# Patient Record
Sex: MALE | Race: BLACK OR AFRICAN AMERICAN | NOT HISPANIC OR LATINO | Employment: FULL TIME | URBAN - METROPOLITAN AREA
[De-identification: names, ages, dates, MRNs, and addresses within clinical notes are randomized per-mention and may not be internally consistent; named-entity substitution may affect disease eponyms.]

---

## 2022-04-13 ENCOUNTER — HISTORICAL (OUTPATIENT)
Dept: RADIOLOGY | Facility: HOSPITAL | Age: 47
End: 2022-04-13

## 2022-04-13 ENCOUNTER — HISTORICAL (OUTPATIENT)
Dept: ADMINISTRATIVE | Facility: HOSPITAL | Age: 47
End: 2022-04-13

## 2022-05-03 DIAGNOSIS — Z87.891 PERSONAL HISTORY OF TOBACCO USE, PRESENTING HAZARDS TO HEALTH: Primary | ICD-10-CM

## 2022-05-05 DIAGNOSIS — Z87.891 HISTORY OF TOBACCO USE: Primary | ICD-10-CM

## 2022-05-12 DIAGNOSIS — R22.2 PALPABLE MASS OF LOWER BACK: Primary | ICD-10-CM

## 2022-07-21 DIAGNOSIS — E78.2 MIXED HYPERLIPIDEMIA: Primary | ICD-10-CM

## 2022-07-21 NOTE — PROGRESS NOTES
Pt is supposed to have an OV tomorrow 07/22/22 Virtually for HLD f/u with fasting labs - labs not completed as of this AM. Please call and advise the patient he should come in an complete his labs. Will need to reschedule for another virtual visit next week.    Thanks.

## 2022-07-22 ENCOUNTER — LAB VISIT (OUTPATIENT)
Dept: LAB | Facility: HOSPITAL | Age: 47
End: 2022-07-22
Attending: NURSE PRACTITIONER
Payer: COMMERCIAL

## 2022-07-22 DIAGNOSIS — E78.2 MIXED HYPERLIPIDEMIA: ICD-10-CM

## 2022-07-22 LAB
ANION GAP SERPL CALC-SCNC: 7 MEQ/L
APPEARANCE UR: CLEAR
BACTERIA #/AREA URNS AUTO: ABNORMAL /HPF
BILIRUB UR QL STRIP.AUTO: NEGATIVE MG/DL
BUN SERPL-MCNC: 11.8 MG/DL (ref 8.9–20.6)
CALCIUM SERPL-MCNC: 9.8 MG/DL (ref 8.4–10.2)
CHLORIDE SERPL-SCNC: 103 MMOL/L (ref 98–107)
CHOLEST SERPL-MCNC: 220 MG/DL
CHOLEST/HDLC SERPL: 6 {RATIO} (ref 0–5)
CO2 SERPL-SCNC: 28 MMOL/L (ref 22–29)
COLOR UR AUTO: ABNORMAL
CREAT SERPL-MCNC: 1.13 MG/DL (ref 0.73–1.18)
CREAT/UREA NIT SERPL: 10
GLUCOSE SERPL-MCNC: 91 MG/DL (ref 74–100)
GLUCOSE UR QL STRIP.AUTO: NORMAL MG/DL
HDLC SERPL-MCNC: 38 MG/DL (ref 35–60)
HYALINE CASTS #/AREA URNS LPF: ABNORMAL /LPF
KETONES UR QL STRIP.AUTO: NEGATIVE MG/DL
LDLC SERPL CALC-MCNC: 151 MG/DL (ref 50–140)
LEUKOCYTE ESTERASE UR QL STRIP.AUTO: NEGATIVE UNIT/L
NITRITE UR QL STRIP.AUTO: NEGATIVE
PH UR STRIP.AUTO: 5.5 [PH]
POTASSIUM SERPL-SCNC: 4.1 MMOL/L (ref 3.5–5.1)
PROT UR QL STRIP.AUTO: NEGATIVE MG/DL
RBC #/AREA URNS AUTO: ABNORMAL /HPF
RBC UR QL AUTO: ABNORMAL UNIT/L
SODIUM SERPL-SCNC: 138 MMOL/L (ref 136–145)
SP GR UR STRIP.AUTO: 1.01
SQUAMOUS #/AREA URNS LPF: ABNORMAL /HPF
TRIGL SERPL-MCNC: 154 MG/DL (ref 34–140)
UROBILINOGEN UR STRIP-ACNC: NORMAL MG/DL
VLDLC SERPL CALC-MCNC: 31 MG/DL
WBC #/AREA URNS AUTO: ABNORMAL /HPF

## 2022-07-22 PROCEDURE — 80061 LIPID PANEL: CPT

## 2022-07-22 PROCEDURE — 81001 URINALYSIS AUTO W/SCOPE: CPT

## 2022-07-22 PROCEDURE — 36415 COLL VENOUS BLD VENIPUNCTURE: CPT

## 2022-07-22 PROCEDURE — 80048 BASIC METABOLIC PNL TOTAL CA: CPT

## 2022-07-27 PROBLEM — I10 PRIMARY HYPERTENSION: Chronic | Status: ACTIVE | Noted: 2022-07-27

## 2022-07-27 PROBLEM — E78.2 MIXED HYPERLIPIDEMIA: Chronic | Status: ACTIVE | Noted: 2022-07-27

## 2022-07-27 PROBLEM — I10 PRIMARY HYPERTENSION: Status: ACTIVE | Noted: 2022-07-27

## 2022-08-01 ENCOUNTER — OFFICE VISIT (OUTPATIENT)
Dept: INTERNAL MEDICINE | Facility: CLINIC | Age: 47
End: 2022-08-01
Payer: COMMERCIAL

## 2022-08-01 DIAGNOSIS — E78.2 MIXED HYPERLIPIDEMIA: Primary | Chronic | ICD-10-CM

## 2022-08-01 PROCEDURE — 1160F PR REVIEW ALL MEDS BY PRESCRIBER/CLIN PHARMACIST DOCUMENTED: ICD-10-PCS | Mod: CPTII,95,, | Performed by: NURSE PRACTITIONER

## 2022-08-01 PROCEDURE — 99214 PR OFFICE/OUTPT VISIT, EST, LEVL IV, 30-39 MIN: ICD-10-PCS | Mod: 95,,, | Performed by: NURSE PRACTITIONER

## 2022-08-01 PROCEDURE — 1159F MED LIST DOCD IN RCRD: CPT | Mod: CPTII,95,, | Performed by: NURSE PRACTITIONER

## 2022-08-01 PROCEDURE — 99214 OFFICE O/P EST MOD 30 MIN: CPT | Mod: 95,,, | Performed by: NURSE PRACTITIONER

## 2022-08-01 PROCEDURE — 1159F PR MEDICATION LIST DOCUMENTED IN MEDICAL RECORD: ICD-10-PCS | Mod: CPTII,95,, | Performed by: NURSE PRACTITIONER

## 2022-08-01 PROCEDURE — 1160F RVW MEDS BY RX/DR IN RCRD: CPT | Mod: CPTII,95,, | Performed by: NURSE PRACTITIONER

## 2022-08-01 RX ORDER — ROSUVASTATIN CALCIUM 40 MG/1
40 TABLET, COATED ORAL NIGHTLY
Qty: 30 TABLET | Refills: 3 | Status: SHIPPED | OUTPATIENT
Start: 2022-08-01 | End: 2024-03-27

## 2022-08-01 NOTE — ASSESSMENT & PLAN NOTE
Increase RX Rosuvastatin to 40 mg every evening  F/U in 3 months via audio virtual with fasting labs prior to eval.    Follow a low cholesterol, low saturated fat diet with less than 200 mg of cholesterol a day.   Avoid fried foods and high saturated fats (kelly, sausage, cookies, cakes, chips, cheese, whole milk, butter, mayonnaise, creamy dressings, gravy, stew, gumbo, boudin, cracklins and cream sauces).  Add flax seed or fish oil supplements to diet.   Increase dietary fiber.   Regular exercise improves cholesterol levels.  Physical activity 5 times a week for 30 minutes per day (or 150 minutes per week).   Stressed importance of dietary modifications.

## 2022-08-01 NOTE — PROGRESS NOTES
JUAN LUIS Donnelly   [facility]  [address] [facility] [address]      PATIENT NAME: Celestino Wade  : 1975  DATE: 22  MRN: [id number]      Billing Provider: JUAN LUIS Donnelly  Level of Service: [place] OFFICE/OUTPT VISIT, EST, LEVL IV, 30-39 MIN  Patient PCP Information     Provider PCP Type    JUAN LUIS Donnelly General          Reason for Visit / Chief Complaint: Follow-up (Lab review)       History of Present Illness / Problem Focused Workflow     Celestino Wade presents to the clinic with Follow-up (Lab review)     45 yo AAM here today for f/u via telemed for lab review. PMH HTN and Tobacco use ( 1 ppd x 15 years) .    22  Pt reports today to establish care. BP WNL today, reports compliance with amlodipine,  has no concerns, refilled appropriately. Agreeable to wellness labs today, will f/u next week for lab review. Discussed Colon cancer Screening with patient, pt is agreeable to referral to Gastro for Colonoscopy. Pt also reports with a bump on his back, after examination noted to have numerous masses on his bilateral back, bilateral abdomen, and right arm. Pt reported has only noted this for the past 2 weeks. Denies any pain or tenderness.      22  Pt present today to discuss lab results, discussed low vitamin D, pt will start RX then change to OTC with Calcium; discussed elevated FLP - the patient verbalized understanding in regards to diet and exercise, will start an OTC Fish Oil, is agreeable to starting statin therapy, aware to take in the evening. Is agreeable to 3 month f/u via telemed, appt made today during OV. Denies any other concerns today, labs discussed with no issues, meds reviewed and refilled approrpriately.    22  Pt here today via audio virtaul OV to discuss HLD and FLP results. LOV, pt was started on rosuvatatin 20 mg every evening.       Review of Systems     Review of Systems    Medical / Social /  Family History     Past Medical History:   Diagnosis Date    Closed fracture of multiple ribs of right side     Closed fracture of right clavicle     Closed fracture of spinous process of axis     Laceration of liver     Laceration of right knee     Neurologic disorder        Past Surgical History:   Procedure Laterality Date    FINGER SURGERY Left        Social History    reports that he has quit smoking. He has never used smokeless tobacco. He reports current alcohol use. He reports that he does not use drugs.    Family History  's family history includes No Known Problems in his father and mother.    Medications and Allergies     Medications  Medication List with Changes/Refills   Current Medications    AMLODIPINE (NORVASC) 5 MG TABLET    Take 5 mg by mouth.   Changed and/or Refilled Medications    Modified Medication Previous Medication    ROSUVASTATIN (CRESTOR) 40 MG TAB rosuvastatin (CRESTOR) 20 MG tablet       Take 1 tablet (40 mg total) by mouth every evening. For High Cholesterol             Allergies  Review of patient's allergies indicates:  No Known Allergies    Physical Examination   There were no vitals filed for this visit.  Physical Exam      Results         Assessment and Plan (including Health Maintenance)     Plan:         Health Maintenance Due   Topic Date Due    Hepatitis C Screening  Never done    HIV Screening  Never done    Colorectal Cancer Screening  Never done    COVID-19 Vaccine (3 - Booster for Pfizer series) 09/29/2021       Problem List Items Addressed This Visit        Cardiac/Vascular    Mixed hyperlipidemia - Primary (Chronic)    Current Assessment & Plan     Increase RX Rosuvastatin to 40 mg every evening  F/U in 3 months via audio virtual with fasting labs prior to eval.    Follow a low cholesterol, low saturated fat diet with less than 200 mg of cholesterol a day.   Avoid fried foods and high saturated fats (kelly, sausage, cookies, cakes, chips, cheese, whole  milk, butter, mayonnaise, creamy dressings, gravy, stew, gumbo, boudin, cracklins and cream sauces).  Add flax seed or fish oil supplements to diet.   Increase dietary fiber.   Regular exercise improves cholesterol levels.  Physical activity 5 times a week for 30 minutes per day (or 150 minutes per week).   Stressed importance of dietary modifications.             Relevant Medications    rosuvastatin (CRESTOR) 40 MG Tab    Other Relevant Orders    Basic Metabolic Panel    Lipid Panel    Urinalysis, Reflex to Urine Culture Urine, Clean Catch          Health Maintenance Topics with due status: Not Due       Topic Last Completion Date    TETANUS VACCINE 04/13/2022    Lipid Panel 07/22/2022    Influenza Vaccine Not Due       Future Appointments   Date Time Provider Department Center   10/28/2022  7:45 AM JUAN LUIS Donnelly [facility]            Signature:     [facility]  [address] [facility] [address]    Date of encounter: 8/1/22    Established Patient - Audio Only Telehealth Visit     The patient location is: home  The chief complaint leading to consultation is: lab review  Visit type: Virtual visit with audio only (telephone)  Total time spent with patient: 8 mins       The reason for the audio only service rather than synchronous audio and video virtual visit was related to technical difficulties or patient preference/necessity.     Each patient to whom I provide medical services by telemedicine is:  (1) informed of the relationship between the physician and patient and the respective role of any other health care provider with respect to management of the patient; and (2) notified that they may decline to receive medical services by telemedicine and may withdraw from such care at any time. Patient verbally consented to receive this service via voice-only telephone call.         This service was not originating from a related E/M  service provided within the previous 7 days nor will  to an E/M service or procedure within the next 24 hours or my soonest available appointment.  Prevailing standard of care was able to be met in this audio-only visit.

## 2022-12-13 DIAGNOSIS — E78.2 MIXED HYPERLIPIDEMIA: Primary | ICD-10-CM

## 2024-03-25 ENCOUNTER — LAB VISIT (OUTPATIENT)
Dept: LAB | Facility: HOSPITAL | Age: 49
End: 2024-03-25
Attending: NURSE PRACTITIONER
Payer: COMMERCIAL

## 2024-03-25 ENCOUNTER — OFFICE VISIT (OUTPATIENT)
Dept: INTERNAL MEDICINE | Facility: CLINIC | Age: 49
End: 2024-03-25
Payer: COMMERCIAL

## 2024-03-25 VITALS
SYSTOLIC BLOOD PRESSURE: 136 MMHG | BODY MASS INDEX: 31.35 KG/M2 | DIASTOLIC BLOOD PRESSURE: 80 MMHG | RESPIRATION RATE: 16 BRPM | WEIGHT: 219 LBS | HEIGHT: 70 IN | HEART RATE: 73 BPM | TEMPERATURE: 99 F

## 2024-03-25 DIAGNOSIS — Z12.5 PROSTATE CANCER SCREENING: ICD-10-CM

## 2024-03-25 DIAGNOSIS — Z00.00 WELLNESS EXAMINATION: ICD-10-CM

## 2024-03-25 DIAGNOSIS — Z00.00 WELLNESS EXAMINATION: Primary | ICD-10-CM

## 2024-03-25 DIAGNOSIS — Z11.59 NEED FOR HEPATITIS C SCREENING TEST: ICD-10-CM

## 2024-03-25 DIAGNOSIS — Z01.00 ROUTINE EYE EXAM: ICD-10-CM

## 2024-03-25 DIAGNOSIS — Z11.4 SCREENING FOR HIV (HUMAN IMMUNODEFICIENCY VIRUS): ICD-10-CM

## 2024-03-25 DIAGNOSIS — Z11.3 SCREENING EXAMINATION FOR STD (SEXUALLY TRANSMITTED DISEASE): ICD-10-CM

## 2024-03-25 LAB
ALBUMIN SERPL-MCNC: 4.2 G/DL (ref 3.5–5)
ALBUMIN/GLOB SERPL: 1.2 RATIO (ref 1.1–2)
ALP SERPL-CCNC: 66 UNIT/L (ref 40–150)
ALT SERPL-CCNC: 37 UNIT/L (ref 0–55)
APPEARANCE UR: CLEAR
AST SERPL-CCNC: 28 UNIT/L (ref 5–34)
BACTERIA #/AREA URNS AUTO: ABNORMAL /HPF
BASOPHILS # BLD AUTO: 0.03 X10(3)/MCL
BASOPHILS NFR BLD AUTO: 0.4 %
BILIRUB SERPL-MCNC: 0.4 MG/DL
BILIRUB UR QL STRIP.AUTO: NEGATIVE
BUN SERPL-MCNC: 12.5 MG/DL (ref 8.9–20.6)
C TRACH DNA SPEC QL NAA+PROBE: NOT DETECTED
CALCIUM SERPL-MCNC: 10 MG/DL (ref 8.4–10.2)
CHLORIDE SERPL-SCNC: 105 MMOL/L (ref 98–107)
CHOLEST SERPL-MCNC: 266 MG/DL
CHOLEST/HDLC SERPL: 6 {RATIO} (ref 0–5)
CO2 SERPL-SCNC: 26 MMOL/L (ref 22–29)
COLOR UR AUTO: ABNORMAL
CREAT SERPL-MCNC: 1.28 MG/DL (ref 0.73–1.18)
DEPRECATED CALCIDIOL+CALCIFEROL SERPL-MC: 12.6 NG/ML (ref 30–80)
EOSINOPHIL # BLD AUTO: 0.22 X10(3)/MCL (ref 0–0.9)
EOSINOPHIL NFR BLD AUTO: 3 %
ERYTHROCYTE [DISTWIDTH] IN BLOOD BY AUTOMATED COUNT: 14.6 % (ref 11.5–17)
EST. AVERAGE GLUCOSE BLD GHB EST-MCNC: 111.2 MG/DL
GFR SERPLBLD CREATININE-BSD FMLA CKD-EPI: >60 MLS/MIN/1.73/M2
GLOBULIN SER-MCNC: 3.5 GM/DL (ref 2.4–3.5)
GLUCOSE SERPL-MCNC: 81 MG/DL (ref 74–100)
GLUCOSE UR QL STRIP.AUTO: NORMAL
HBA1C MFR BLD: 5.5 %
HCT VFR BLD AUTO: 43.4 % (ref 42–52)
HCV AB SERPL QL IA: NONREACTIVE
HDLC SERPL-MCNC: 42 MG/DL (ref 35–60)
HGB BLD-MCNC: 14.3 G/DL (ref 14–18)
HIV 1+2 AB+HIV1 P24 AG SERPL QL IA: NONREACTIVE
HYALINE CASTS #/AREA URNS LPF: ABNORMAL /LPF
IMM GRANULOCYTES # BLD AUTO: 0.01 X10(3)/MCL (ref 0–0.04)
IMM GRANULOCYTES NFR BLD AUTO: 0.1 %
KETONES UR QL STRIP.AUTO: NEGATIVE
LDLC SERPL CALC-MCNC: 165 MG/DL (ref 50–140)
LEUKOCYTE ESTERASE UR QL STRIP.AUTO: 75
LYMPHOCYTES # BLD AUTO: 2.75 X10(3)/MCL (ref 0.6–4.6)
LYMPHOCYTES NFR BLD AUTO: 37.7 %
MCH RBC QN AUTO: 28.9 PG (ref 27–31)
MCHC RBC AUTO-ENTMCNC: 32.9 G/DL (ref 33–36)
MCV RBC AUTO: 87.9 FL (ref 80–94)
MONOCYTES # BLD AUTO: 0.48 X10(3)/MCL (ref 0.1–1.3)
MONOCYTES NFR BLD AUTO: 6.6 %
MUCOUS THREADS URNS QL MICRO: ABNORMAL /LPF
N GONORRHOEA DNA SPEC QL NAA+PROBE: NOT DETECTED
NEUTROPHILS # BLD AUTO: 3.8 X10(3)/MCL (ref 2.1–9.2)
NEUTROPHILS NFR BLD AUTO: 52.2 %
NITRITE UR QL STRIP.AUTO: NEGATIVE
NRBC BLD AUTO-RTO: 0 %
PH UR STRIP.AUTO: 5.5 [PH]
PLATELET # BLD AUTO: 272 X10(3)/MCL (ref 130–400)
PMV BLD AUTO: 10.5 FL (ref 7.4–10.4)
POTASSIUM SERPL-SCNC: 4 MMOL/L (ref 3.5–5.1)
PROT SERPL-MCNC: 7.7 GM/DL (ref 6.4–8.3)
PROT UR QL STRIP.AUTO: NEGATIVE
PSA SERPL-MCNC: 0.3 NG/ML
RBC # BLD AUTO: 4.94 X10(6)/MCL (ref 4.7–6.1)
RBC #/AREA URNS AUTO: ABNORMAL /HPF
RBC UR QL AUTO: ABNORMAL
SODIUM SERPL-SCNC: 140 MMOL/L (ref 136–145)
SOURCE (OHS): NORMAL
SP GR UR STRIP.AUTO: 1.02 (ref 1–1.03)
SQUAMOUS #/AREA URNS LPF: ABNORMAL /HPF
T PALLIDUM AB SER QL: NONREACTIVE
TRIGL SERPL-MCNC: 295 MG/DL (ref 34–140)
TSH SERPL-ACNC: 2.15 UIU/ML (ref 0.35–4.94)
UROBILINOGEN UR STRIP-ACNC: NORMAL
VLDLC SERPL CALC-MCNC: 59 MG/DL
WBC # SPEC AUTO: 7.29 X10(3)/MCL (ref 4.5–11.5)
WBC #/AREA URNS AUTO: ABNORMAL /HPF

## 2024-03-25 PROCEDURE — 99215 OFFICE O/P EST HI 40 MIN: CPT | Mod: PBBFAC | Performed by: NURSE PRACTITIONER

## 2024-03-25 PROCEDURE — 85025 COMPLETE CBC W/AUTO DIFF WBC: CPT

## 2024-03-25 PROCEDURE — 1160F RVW MEDS BY RX/DR IN RCRD: CPT | Mod: CPTII,,, | Performed by: NURSE PRACTITIONER

## 2024-03-25 PROCEDURE — 82306 VITAMIN D 25 HYDROXY: CPT

## 2024-03-25 PROCEDURE — 84443 ASSAY THYROID STIM HORMONE: CPT

## 2024-03-25 PROCEDURE — 3079F DIAST BP 80-89 MM HG: CPT | Mod: CPTII,,, | Performed by: NURSE PRACTITIONER

## 2024-03-25 PROCEDURE — 1159F MED LIST DOCD IN RCRD: CPT | Mod: CPTII,,, | Performed by: NURSE PRACTITIONER

## 2024-03-25 PROCEDURE — 81001 URINALYSIS AUTO W/SCOPE: CPT

## 2024-03-25 PROCEDURE — 80053 COMPREHEN METABOLIC PANEL: CPT

## 2024-03-25 PROCEDURE — 87389 HIV-1 AG W/HIV-1&-2 AB AG IA: CPT

## 2024-03-25 PROCEDURE — 3075F SYST BP GE 130 - 139MM HG: CPT | Mod: CPTII,,, | Performed by: NURSE PRACTITIONER

## 2024-03-25 PROCEDURE — 84153 ASSAY OF PSA TOTAL: CPT

## 2024-03-25 PROCEDURE — 80061 LIPID PANEL: CPT

## 2024-03-25 PROCEDURE — 86803 HEPATITIS C AB TEST: CPT

## 2024-03-25 PROCEDURE — 87491 CHLMYD TRACH DNA AMP PROBE: CPT

## 2024-03-25 PROCEDURE — 36415 COLL VENOUS BLD VENIPUNCTURE: CPT

## 2024-03-25 PROCEDURE — 86780 TREPONEMA PALLIDUM: CPT

## 2024-03-25 PROCEDURE — 99396 PREV VISIT EST AGE 40-64: CPT | Mod: S$PBB,,, | Performed by: NURSE PRACTITIONER

## 2024-03-25 PROCEDURE — 83036 HEMOGLOBIN GLYCOSYLATED A1C: CPT

## 2024-03-25 PROCEDURE — 3008F BODY MASS INDEX DOCD: CPT | Mod: CPTII,,, | Performed by: NURSE PRACTITIONER

## 2024-03-25 NOTE — ASSESSMENT & PLAN NOTE
Pt wellness visit completed today with appropriate lab work.   HM Topics Reviewed / Updated  Immunizations Discussed  Dicussed Healthy Diet &   Encouraged to exercise 3 x weekly  Increase Water Intake  Eat more fruits and vegetables  Avoid soda & alcohol

## 2024-03-25 NOTE — PROGRESS NOTES
JUAN LUIS Donnelly   [facility]  [address] [facility] [address]      PATIENT NAME: Celestino Wade  : 1975  DATE: 3/25/24  MRN: [id number]      Patient PCP Information       Provider PCP Type    JUAN LUIS Donnelly General            Reason for Visit / Chief Complaint: Health Maintenance (Med refill)       History of Present Illness / Problem Focused Workflow     Celestino Wade presents to the clinic with Health Maintenance (Med refill)     47 yo AAM here today for ff/u. Medical problems include HTN, HLD, and Tobacco use    Prostate Cancer Screening - 24 PSA   Colon Cancer Screening -   [facility]astro- Colonoscopy, Records Requested  Osteoporosis Screening - 24 Vitamin D level  Eye Screening - External Nitin   Dental Screening - External   Wellness Screening - 24  Pt here today to re-establish care, LOV > 2 years ago. Pt is agreeable to wellness OV & labs today, we will f/u next week via virtual for lab review with fasting labs. Will restart medication after LOV. Pt reports the statin medication used to cause his knees to hurt so he did not take it as he should. Will discuss change of meds at NOV.    topics reviewed and discussed.             Review of Systems     Review of Systems   Constitutional: Negative.    HENT: Negative.     Eyes: Negative.    Respiratory: Negative.     Cardiovascular: Negative.    Gastrointestinal: Negative.    Endocrine: Negative.    Genitourinary: Negative.    Neurological: Negative.    Psychiatric/Behavioral: Negative.           Medications and Allergies     Medications  Current Outpatient Medications   Medication Instructions    amLODIPine (NORVASC) 5 mg, Oral    rosuvastatin (CRESTOR) 40 mg, Oral, Nightly, For High Cholesterol         Allergies  Review of patient's allergies indicates:  No Known Allergies    Physical Examination     Visit Vitals  /80   Pulse 73   Temp 99 °F  "(37.2 °C)   Resp 16   Ht 5' 10" (1.778 m)   Wt 99.3 kg (219 lb)   BMI 31.42 kg/m²       Physical Exam  Vitals reviewed.   Constitutional:       Appearance: Normal appearance. He is normal weight.   HENT:      Head: Normocephalic.   Cardiovascular:      Rate and Rhythm: Normal rate and regular rhythm.      Pulses: Normal pulses.      Heart sounds: Normal heart sounds.   Pulmonary:      Effort: Pulmonary effort is normal.      Breath sounds: Normal breath sounds.   Abdominal:      General: Abdomen is flat.      Palpations: Abdomen is soft.   Musculoskeletal:         General: Normal range of motion.      Cervical back: Normal range of motion.   Skin:     General: Skin is warm and dry.   Neurological:      Mental Status: He is alert.   Psychiatric:         Mood and Affect: Mood normal.           Results       Assessment        ICD-10-CM ICD-9-CM   1. Wellness examination  Z00.00 V70.0   2. Need for hepatitis C screening test  Z11.59 V73.89   3. Prostate cancer screening  Z12.5 V76.44   4. Screening for HIV (human immunodeficiency virus)  Z11.4 V73.89   5. Screening examination for STD (sexually transmitted disease)  Z11.3 V74.5   6. Routine eye exam  Z01.00 V72.0        Plan      Problem List Items Addressed This Visit          Other    Wellness examination - Primary    Current Assessment & Plan     Pt wellness visit completed today with appropriate lab work.    Topics Reviewed / Updated  Immunizations Discussed  Dicussed Healthy Diet &   Encouraged to exercise 3 x weekly  Increase Water Intake  Eat more fruits and vegetables  Avoid soda & alcohol           Relevant Orders    TSH    Hemoglobin A1C    Vitamin D    Comprehensive Metabolic Panel    Urinalysis, Reflex to Urine Culture    Lipid Panel    CBC Auto Differential     Other Visit Diagnoses       Need for hepatitis C screening test        Relevant Orders    Hepatitis C Antibody    Prostate cancer screening        Relevant Orders    PSA, Screening    Screening for " HIV (human immunodeficiency virus)        Relevant Orders    HIV 1/2 Ag/Ab (4th Gen)    Screening examination for STD (sexually transmitted disease)        Relevant Orders    Chlamydia/GC, PCR    SYPHILIS ANTIBODY (WITH REFLEX RPR)    Routine eye exam        Relevant Orders    Ambulatory referral/consult to Ophthalmology            Future Appointments   Date Time Provider Department Center   3/25/2024  1:00 PM [facility]   3/27/2024  2:40 PM Teresa Jorgensen FNP [facility]        Follow up in about 2 days (around 3/27/2024) for Established Virtual - Wellness Lab Review.      Signature:     [facility]  [address] [facility] [address]    Date of encounter: 3/25/24

## 2024-03-27 ENCOUNTER — OFFICE VISIT (OUTPATIENT)
Dept: INTERNAL MEDICINE | Facility: CLINIC | Age: 49
End: 2024-03-27
Payer: COMMERCIAL

## 2024-03-27 DIAGNOSIS — I10 PRIMARY HYPERTENSION: Chronic | ICD-10-CM

## 2024-03-27 DIAGNOSIS — E78.2 MIXED HYPERLIPIDEMIA: Primary | Chronic | ICD-10-CM

## 2024-03-27 DIAGNOSIS — E55.9 VITAMIN D DEFICIENCY: ICD-10-CM

## 2024-03-27 PROCEDURE — 3044F HG A1C LEVEL LT 7.0%: CPT | Mod: CPTII,95,, | Performed by: NURSE PRACTITIONER

## 2024-03-27 PROCEDURE — 1159F MED LIST DOCD IN RCRD: CPT | Mod: CPTII,95,, | Performed by: NURSE PRACTITIONER

## 2024-03-27 PROCEDURE — 1160F RVW MEDS BY RX/DR IN RCRD: CPT | Mod: CPTII,95,, | Performed by: NURSE PRACTITIONER

## 2024-03-27 PROCEDURE — 99214 OFFICE O/P EST MOD 30 MIN: CPT | Mod: 95,,, | Performed by: NURSE PRACTITIONER

## 2024-03-27 RX ORDER — ATORVASTATIN CALCIUM 20 MG/1
20 TABLET, FILM COATED ORAL NIGHTLY
Qty: 90 TABLET | Refills: 0 | Status: SHIPPED | OUTPATIENT
Start: 2024-03-27 | End: 2024-05-31 | Stop reason: SDUPTHER

## 2024-03-27 RX ORDER — AMLODIPINE BESYLATE 5 MG/1
5 TABLET ORAL NIGHTLY
Qty: 90 TABLET | Refills: 1 | Status: SHIPPED | OUTPATIENT
Start: 2024-03-27 | End: 2024-05-31 | Stop reason: SDUPTHER

## 2024-03-27 RX ORDER — ERGOCALCIFEROL 1.25 MG/1
50000 CAPSULE ORAL
Qty: 8 CAPSULE | Refills: 0 | Status: SHIPPED | OUTPATIENT
Start: 2024-03-27 | End: 2024-05-16

## 2024-03-27 NOTE — ASSESSMENT & PLAN NOTE
Educated on increasing foods high in Vitamin D such as fish oil, cod liver oil, salmon, milk fortified with vitamin D.  RX Vitamin D3 31354 IU weekly x 12 weeks.  Complete entire 12 weeks of Vitamin D prescription.  After completion of prescription (12 weeks/3 months), begin taking Vitamin D 2000 I.U. tablets daily (purchase over the counter).  Repeat Vitamin D level as ordered.    Lab Results   Component Value Date    [id number] 12.6 (L) 03/25/2024

## 2024-03-27 NOTE — ASSESSMENT & PLAN NOTE
Restart RX amlodipine 5 mg daily  136/80  Low Sodium Diet (Dash Diet - less than 2 grams of sodium per day).  Maintain healthy weight with goal BMI <30. Exercise 30 minutes per day 5 days per week.

## 2024-03-27 NOTE — PROGRESS NOTES
Teresa Jorgensen, JUAN LUIS   [facility]  [address] [facility] [address]      PATIENT NAME: Celestino Wade  : 1975  DATE: 3/27/24  MRN: [id number]      Reason for Visit / Chief Complaint: Health Maintenance (Lab review )       History of Present Illness / Problem Focused Workflow     Celestino Wade presents to the clinic with Health Maintenance (Lab review )     47 yo AAM here today for ff/u. Medical problems include HTN, HLD, and Tobacco use    Prostate Cancer Screening - 24 PSA 0.30  Colon Cancer Screening -   [facility] Gastro- Colonoscopy, Records Requested  Osteoporosis Screening - 24 Vitamin D level 12.6  Eye Screening - External Nitin   Dental Screening - External   Wellness Screening - 24  Pt here today to re-establish care, LOV > 2 years ago. Pt is agreeable to wellness OV & labs today, we will f/u next week via virtual for lab review with fasting labs. Will restart medication after LOV. Pt reports the statin medication used to cause his knees to hurt so he did not take it as he should. Will discuss change of meds at NOV.   HM topics reviewed and discussed.     24  Pt here today via virtual for lab review; labs reviewed and discussed. FLP elevated, will start pt on atorvastatin 20 mg every evening today and will f/u in about 2 months with labs via virtual for review. Discussed with pt conservative lifestyle changes as well for HLD. Discussed low Vitamin D level with pt as well, will start RX Vitamin D x 8 weeks then start OTC therafter.           Review of Systems     Review of Systems   Constitutional:  Negative for activity change and unexpected weight change.   HENT:  Negative for hearing loss, rhinorrhea and trouble swallowing.    Eyes:  Negative for discharge and visual disturbance.   Respiratory:  Negative for chest tightness and wheezing.    Cardiovascular:  Negative for chest pain and  palpitations.   Gastrointestinal:  Negative for blood in stool, constipation, diarrhea and vomiting.   Endocrine: Negative for polydipsia and polyuria.   Genitourinary:  Negative for difficulty urinating, hematuria and urgency.   Musculoskeletal:  Negative for arthralgias, joint swelling and neck pain.   Neurological:  Negative for weakness and headaches.   Psychiatric/Behavioral:  Negative for confusion and dysphoric mood.          Medications and Allergies     Medications  Medication List with Changes/Refills   New Medications    ATORVASTATIN (LIPITOR) 20 MG TABLET    Take 1 tablet (20 mg total) by mouth every evening. For High Cholesterol    ERGOCALCIFEROL (ERGOCALCIFEROL) 50,000 UNIT CAP    Take 1 capsule (50,000 Units total) by mouth every 7 days. For Low Vitamin D. Start over the counter once completed. for 8 doses   Changed and/or Refilled Medications    Modified Medication Previous Medication    AMLODIPINE (NORVASC) 5 MG TABLET amLODIPine (NORVASC) 5 MG tablet       Take 1 tablet (5 mg total) by mouth every evening. For High Blood Pressure    Take 5 mg by mouth.   Discontinued Medications    ROSUVASTATIN (CRESTOR) 40 MG TAB    Take 1 tablet (40 mg total) by mouth every evening. For High Cholesterol         Allergies  Review of patient's allergies indicates:  No Known Allergies    Physical Examination   There were no vitals filed for this visit.  Physical Exam      Results     Lab Results   Component Value Date    WBC 7.29 03/25/2024    RBC 4.94 03/25/2024    HGB 14.3 03/25/2024    HCT 43.4 03/25/2024    MCV 87.9 03/25/2024    MCH 28.9 03/25/2024    MCHC 32.9 (L) 03/25/2024    RDW 14.6 03/25/2024     03/25/2024    MPV 10.5 (H) 03/25/2024     Sodium Level   Date Value Ref Range Status   03/25/2024 140 136 - 145 mmol/L Final     Potassium Level   Date Value Ref Range Status   03/25/2024 4.0 3.5 - 5.1 mmol/L Final     Carbon Dioxide   Date Value Ref Range Status   03/25/2024 26 22 - 29 mmol/L Final      Blood Urea Nitrogen   Date Value Ref Range Status   03/25/2024 12.5 8.9 - 20.6 mg/dL Final     Creatinine   Date Value Ref Range Status   03/25/2024 1.28 (H) 0.73 - 1.18 mg/dL Final     Calcium Level Total   Date Value Ref Range Status   03/25/2024 10.0 8.4 - 10.2 mg/dL Final     Albumin Level   Date Value Ref Range Status   03/25/2024 4.2 3.5 - 5.0 g/dL Final     Bilirubin Total   Date Value Ref Range Status   03/25/2024 0.4 <=1.5 mg/dL Final     Alkaline Phosphatase   Date Value Ref Range Status   03/25/2024 66 40 - 150 unit/L Final     Aspartate Aminotransferase   Date Value Ref Range Status   03/25/2024 28 5 - 34 unit/L Final     Alanine Aminotransferase   Date Value Ref Range Status   03/25/2024 37 0 - 55 unit/L Final     Estimated GFR-Non    Date Value Ref Range Status   09/23/2021 85 (L) >>=90 mL/min/1.73 m2 Final     Lab Results   Component Value Date    CHOL 266 (H) 03/25/2024     Lab Results   Component Value Date    HDL 42 03/25/2024     Lab Results   Component Value Date    TRIG 295 (H) 03/25/2024     Lab Results   Component Value Date    VLDL 59 03/25/2024     Lab Results   Component Value Date    .00 (H) 03/25/2024     Lab Results   Component Value Date    TSH 2.149 03/25/2024     Lab Results   Component Value Date    PROTEINUA Negative 03/25/2024    LEUKOCYTESUR 75 (A) 03/25/2024     Lab Results   Component Value Date    HGBA1C 5.5 03/25/2024           Assessment         ICD-10-CM ICD-9-CM   1. Mixed hyperlipidemia  E78.2 272.2   2. Primary hypertension  I10 401.9   3. Vitamin D deficiency  E55.9 268.9       Plan      Problem List Items Addressed This Visit          Cardiac/Vascular    Primary hypertension (Chronic)    Current Assessment & Plan     Restart RX amlodipine 5 mg daily  136/80  Low Sodium Diet (Dash Diet - less than 2 grams of sodium per day).  Maintain healthy weight with goal BMI <30. Exercise 30 minutes per day 5 days per week.           Relevant Medications     amLODIPine (NORVASC) 5 MG tablet    Mixed hyperlipidemia - Primary (Chronic)    Current Assessment & Plan     FLP Elevated / Not At Goal  Start RX atorvastatin 20 mg q hs  F/U in 2 months via virtual with labs   Follow a low cholesterol, low saturated fat diet with less than 200 mg of cholesterol a day.   Avoid fried foods and high saturated fats (kelly, sausage, cookies, cakes, chips, cheese, whole milk, butter, mayonnaise, creamy dressings, gravy, stew, gumbo, boudin, cracklins and cream sauces).  Add flax seed or fish oil supplements to diet.   Increase dietary fiber.   Regular exercise improves cholesterol levels.  Physical activity 5 times a week for 30 minutes per day (or 150 minutes per week).   Stressed importance of dietary modifications.    Lab Results   Component Value Date    CHOL 266 (H) 03/25/2024     Lab Results   Component Value Date    HDL 42 03/25/2024     Lab Results   Component Value Date    TRIG 295 (H) 03/25/2024     Lab Results   Component Value Date    VLDL 59 03/25/2024     Lab Results   Component Value Date    .00 (H) 03/25/2024            Relevant Medications    atorvastatin (LIPITOR) 20 MG tablet    Other Relevant Orders    Basic Metabolic Panel    Urinalysis, Reflex to Urine Culture    Lipid Panel       Endocrine    Vitamin D deficiency    Current Assessment & Plan     Educated on increasing foods high in Vitamin D such as fish oil, cod liver oil, salmon, milk fortified with vitamin D.  RX Vitamin D3 36804 IU weekly x 12 weeks.  Complete entire 12 weeks of Vitamin D prescription.  After completion of prescription (12 weeks/3 months), begin taking Vitamin D 2000 I.U. tablets daily (purchase over the counter).  Repeat Vitamin D level as ordered.    Lab Results   Component Value Date    [id number] 12.6 (L) 03/25/2024              Relevant Medications    ergocalciferol (ERGOCALCIFEROL) 50,000 unit Cap       No future appointments.         Signature:     [facility]  [address] [facility] [address]    Date of encounter: 3/27/24    The patient location is: work  The chief complaint leading to consultation is: Lab review     Visit type: audiovisual    Face to Face time with patient: 15  20 minutes of total time spent on the encounter, which includes face to face time and non-face to face time preparing to see the patient (eg, review of tests), Obtaining and/or reviewing separately obtained history, Documenting clinical information in the electronic or other health record, Independently interpreting results (not separately reported) and communicating results to the patient/family/caregiver, or Care coordination (not separately reported).         Each patient to whom he or she provides medical services by telemedicine is:  (1) informed of the relationship between the physician and patient and the respective role of any other health care provider with respect to management of the patient; and (2) notified that he or she may decline to receive medical services by telemedicine and may withdraw from such care at any time.    Notes:

## 2024-03-27 NOTE — ASSESSMENT & PLAN NOTE
FLP Elevated / Not At Goal  Start RX atorvastatin 20 mg q hs  F/U in 2 months via virtual with labs   Follow a low cholesterol, low saturated fat diet with less than 200 mg of cholesterol a day.   Avoid fried foods and high saturated fats (kelly, sausage, cookies, cakes, chips, cheese, whole milk, butter, mayonnaise, creamy dressings, gravy, stew, gumbo, boudin, cracklins and cream sauces).  Add flax seed or fish oil supplements to diet.   Increase dietary fiber.   Regular exercise improves cholesterol levels.  Physical activity 5 times a week for 30 minutes per day (or 150 minutes per week).   Stressed importance of dietary modifications.    Lab Results   Component Value Date    CHOL 266 (H) 03/25/2024     Lab Results   Component Value Date    HDL 42 03/25/2024     Lab Results   Component Value Date    TRIG 295 (H) 03/25/2024     Lab Results   Component Value Date    VLDL 59 03/25/2024     Lab Results   Component Value Date    .00 (H) 03/25/2024

## 2024-04-05 ENCOUNTER — TELEPHONE (OUTPATIENT)
Dept: INTERNAL MEDICINE | Facility: CLINIC | Age: 49
End: 2024-04-05
Payer: COMMERCIAL

## 2024-04-28 ENCOUNTER — PATIENT MESSAGE (OUTPATIENT)
Dept: ADMINISTRATIVE | Facility: HOSPITAL | Age: 49
End: 2024-04-28
Payer: COMMERCIAL

## 2024-04-29 DIAGNOSIS — Z12.11 SCREENING FOR COLON CANCER: ICD-10-CM

## 2024-05-23 ENCOUNTER — LAB VISIT (OUTPATIENT)
Dept: LAB | Facility: HOSPITAL | Age: 49
End: 2024-05-23
Attending: NURSE PRACTITIONER
Payer: COMMERCIAL

## 2024-05-23 DIAGNOSIS — E78.2 MIXED HYPERLIPIDEMIA: Chronic | ICD-10-CM

## 2024-05-23 LAB
ANION GAP SERPL CALC-SCNC: 10 MEQ/L
BACTERIA #/AREA URNS AUTO: ABNORMAL /HPF
BILIRUB UR QL STRIP.AUTO: NEGATIVE
BUN SERPL-MCNC: 9.4 MG/DL (ref 8.9–20.6)
CALCIUM SERPL-MCNC: 9.7 MG/DL (ref 8.4–10.2)
CHLORIDE SERPL-SCNC: 106 MMOL/L (ref 98–107)
CHOLEST SERPL-MCNC: 161 MG/DL
CHOLEST/HDLC SERPL: 5 {RATIO} (ref 0–5)
CLARITY UR: CLEAR
CO2 SERPL-SCNC: 24 MMOL/L (ref 22–29)
COLOR UR AUTO: ABNORMAL
CREAT SERPL-MCNC: 1.1 MG/DL (ref 0.73–1.18)
CREAT/UREA NIT SERPL: 9
GFR SERPLBLD CREATININE-BSD FMLA CKD-EPI: >60 ML/MIN/1.73/M2
GLUCOSE SERPL-MCNC: 86 MG/DL (ref 74–100)
GLUCOSE UR QL STRIP: NORMAL
HDLC SERPL-MCNC: 32 MG/DL (ref 35–60)
HEMOCCULT STL QL IA: NEGATIVE
HGB UR QL STRIP: ABNORMAL
HYALINE CASTS #/AREA URNS LPF: ABNORMAL /LPF
KETONES UR QL STRIP: NEGATIVE
LDLC SERPL CALC-MCNC: 104 MG/DL (ref 50–140)
LEUKOCYTE ESTERASE UR QL STRIP: NEGATIVE
MUCOUS THREADS URNS QL MICRO: ABNORMAL /LPF
NITRITE UR QL STRIP: NEGATIVE
PH UR STRIP: 5.5 [PH]
POTASSIUM SERPL-SCNC: 4.1 MMOL/L (ref 3.5–5.1)
PROT UR QL STRIP: NEGATIVE
RBC #/AREA URNS AUTO: ABNORMAL /HPF
SODIUM SERPL-SCNC: 140 MMOL/L (ref 136–145)
SP GR UR STRIP.AUTO: 1.01 (ref 1–1.03)
SQUAMOUS #/AREA URNS LPF: ABNORMAL /HPF
TRIGL SERPL-MCNC: 126 MG/DL (ref 34–140)
UROBILINOGEN UR STRIP-ACNC: NORMAL
VLDLC SERPL CALC-MCNC: 25 MG/DL
WBC #/AREA URNS AUTO: ABNORMAL /HPF

## 2024-05-23 PROCEDURE — 36415 COLL VENOUS BLD VENIPUNCTURE: CPT

## 2024-05-23 PROCEDURE — 80061 LIPID PANEL: CPT

## 2024-05-23 PROCEDURE — 81001 URINALYSIS AUTO W/SCOPE: CPT

## 2024-05-23 PROCEDURE — 80048 BASIC METABOLIC PNL TOTAL CA: CPT

## 2024-05-31 ENCOUNTER — OFFICE VISIT (OUTPATIENT)
Dept: INTERNAL MEDICINE | Facility: CLINIC | Age: 49
End: 2024-05-31
Payer: COMMERCIAL

## 2024-05-31 DIAGNOSIS — Z00.00 WELLNESS EXAMINATION: ICD-10-CM

## 2024-05-31 DIAGNOSIS — R31.9 HEMATURIA, UNSPECIFIED TYPE: Primary | ICD-10-CM

## 2024-05-31 DIAGNOSIS — E78.2 MIXED HYPERLIPIDEMIA: Chronic | ICD-10-CM

## 2024-05-31 DIAGNOSIS — I10 PRIMARY HYPERTENSION: Chronic | ICD-10-CM

## 2024-05-31 DIAGNOSIS — Z12.5 PROSTATE CANCER SCREENING: ICD-10-CM

## 2024-05-31 PROCEDURE — 1159F MED LIST DOCD IN RCRD: CPT | Mod: CPTII,95,, | Performed by: NURSE PRACTITIONER

## 2024-05-31 PROCEDURE — 3044F HG A1C LEVEL LT 7.0%: CPT | Mod: CPTII,95,, | Performed by: NURSE PRACTITIONER

## 2024-05-31 PROCEDURE — 99214 OFFICE O/P EST MOD 30 MIN: CPT | Mod: 95,,, | Performed by: NURSE PRACTITIONER

## 2024-05-31 PROCEDURE — 1160F RVW MEDS BY RX/DR IN RCRD: CPT | Mod: CPTII,95,, | Performed by: NURSE PRACTITIONER

## 2024-05-31 RX ORDER — ATORVASTATIN CALCIUM 20 MG/1
20 TABLET, FILM COATED ORAL NIGHTLY
Qty: 90 TABLET | Refills: 3 | Status: SHIPPED | OUTPATIENT
Start: 2024-05-31 | End: 2025-05-31

## 2024-05-31 RX ORDER — AMLODIPINE BESYLATE 5 MG/1
5 TABLET ORAL NIGHTLY
Qty: 90 TABLET | Refills: 3 | Status: SHIPPED | OUTPATIENT
Start: 2024-05-31 | End: 2025-05-31

## 2024-05-31 NOTE — ASSESSMENT & PLAN NOTE
Continue RX amlodipine 5 mg daily  BP at goal   Low Sodium Diet (Dash Diet - less than 2 grams of sodium per day).  Maintain healthy weight with goal BMI <30. Exercise 30 minutes per day 5 days per week.

## 2024-05-31 NOTE — PROGRESS NOTES
JUAN LUIS Donnelly   [facility]  [address] [facility] [address]      PATIENT NAME: Celestino Wade  : 1975  DATE: 24  MRN: [id number]      Patient PCP Information       Provider PCP Type    JUAN LUIS Donnelly General            Reason for Visit / Chief Complaint: Health Maintenance (Lab review )       History of Present Illness / Problem Focused Workflow     Celestino Wade presents to the clinic with Health Maintenance (Lab review )     49 yo AAM here today for ff/u. Medical problems include HTN, HLD, and Tobacco use    Prostate Cancer Screening - 24 PSA 0.30  Colon Cancer Screening -   [facility]astro- Colonoscopy, Records Requested  Osteoporosis Screening - 24 Vitamin D level 12.6  Eye Screening - External Nitin   Dental Screening - External   Wellness Screening - 24  Pt here today to re-establish care, LOV > 2 years ago. Pt is agreeable to wellness OV & labs today, we will f/u next week via virtual for lab review with fasting labs. Will restart medication after LOV. Pt reports the statin medication used to cause his knees to hurt so he did not take it as he should. Will discuss change of meds at NOV.    topics reviewed and discussed.     24  Pt here today via virtual for lab review; labs reviewed and discussed. FLP elevated, will start pt on atorvastatin 20 mg every evening today and will f/u in about 2 months with labs via virtual for review. Discussed with pt conservative lifestyle changes as well for HLD. Discussed low Vitamin D level with pt as well, will start RX Vitamin D x 8 weeks then start OTC therafter.     24  Pt here via virtual for HLD f/u with labs completed; FLP today at goal, pt reports compliance with meds. Meds reviewed and refilled appropriately. Pt with hematuria noted on UA, denies any visible blood in urine, denies any trouble urinating or pain. Agreeable to order for  CT ABD/ Pelvis today for eval. Will f/u with results when avalable. Denies any other issues or concerns at this time. Will f/u for routine wellness in 10 months with labs and prn.           Review of Systems     Review of Systems   Constitutional:  Negative for activity change and unexpected weight change.   HENT:  Negative for hearing loss, rhinorrhea and trouble swallowing.    Eyes:  Negative for discharge and visual disturbance.   Respiratory:  Negative for chest tightness and wheezing.    Cardiovascular:  Negative for chest pain and palpitations.   Gastrointestinal:  Negative for blood in stool, constipation, diarrhea and vomiting.   Endocrine: Negative for polydipsia and polyuria.   Genitourinary:  Negative for difficulty urinating and urgency.   Musculoskeletal:  Negative for arthralgias, joint swelling and neck pain.   Neurological:  Negative for weakness and headaches.   Psychiatric/Behavioral:  Negative for confusion and dysphoric mood.          Medications and Allergies     Medications  Current Outpatient Medications   Medication Instructions    amLODIPine (NORVASC) 5 mg, Oral, Nightly, For High Blood Pressure    atorvastatin (LIPITOR) 20 mg, Oral, Nightly, For High Cholesterol         Allergies  Review of patient's allergies indicates:  No Known Allergies    Physical Examination     There were no vitals taken for this visit.    Physical Exam  HENT:      Right Ear: Hearing normal.      Left Ear: Hearing normal.   Neurological:      Mental Status: He is alert and oriented to person, place, and time.   Psychiatric:         Mood and Affect: Mood normal.           Results     Lab Results   Component Value Date    WBC 7.29 03/25/2024    RBC 4.94 03/25/2024    HGB 14.3 03/25/2024    HCT 43.4 03/25/2024    MCV 87.9 03/25/2024    MCH 28.9 03/25/2024    MCHC 32.9 (L) 03/25/2024    RDW 14.6 03/25/2024     03/25/2024    MPV 10.5 (H) 03/25/2024     CMP  Sodium   Date Value Ref Range Status   05/23/2024 140 136  - 145 mmol/L Final     Potassium   Date Value Ref Range Status   05/23/2024 4.1 3.5 - 5.1 mmol/L Final     CO2   Date Value Ref Range Status   05/23/2024 24 22 - 29 mmol/L Final     Blood Urea Nitrogen   Date Value Ref Range Status   05/23/2024 9.4 8.9 - 20.6 mg/dL Final     Creatinine   Date Value Ref Range Status   05/23/2024 1.10 0.73 - 1.18 mg/dL Final     Calcium   Date Value Ref Range Status   05/23/2024 9.7 8.4 - 10.2 mg/dL Final     Albumin   Date Value Ref Range Status   03/25/2024 4.2 3.5 - 5.0 g/dL Final     Bilirubin Total   Date Value Ref Range Status   03/25/2024 0.4 <=1.5 mg/dL Final     ALP   Date Value Ref Range Status   03/25/2024 66 40 - 150 unit/L Final     AST   Date Value Ref Range Status   03/25/2024 28 5 - 34 unit/L Final     ALT   Date Value Ref Range Status   03/25/2024 37 0 - 55 unit/L Final     Estimated GFR-Non    Date Value Ref Range Status   09/23/2021 85 (L) >>=90 mL/min/1.73 m2 Final     Lab Results   Component Value Date    CHOL 161 05/23/2024     Lab Results   Component Value Date    HDL 32 (L) 05/23/2024     Lab Results   Component Value Date    TRIG 126 05/23/2024     Lab Results   Component Value Date    VLDL 25 05/23/2024     Lab Results   Component Value Date    .00 05/23/2024     Lab Results   Component Value Date    TSH 2.149 03/25/2024         Assessment        ICD-10-CM ICD-9-CM   1. Hematuria, unspecified type  R31.9 599.70   2. Primary hypertension  I10 401.9   3. Mixed hyperlipidemia  E78.2 272.2   4. Wellness examination  Z00.00 V70.0   5. Prostate cancer screening  Z12.5 V76.44        Plan      Problem List Items Addressed This Visit          Cardiac/Vascular    Primary hypertension (Chronic)    Current Assessment & Plan     Continue RX amlodipine 5 mg daily  BP at goal   Low Sodium Diet (Dash Diet - less than 2 grams of sodium per day).  Maintain healthy weight with goal BMI <30. Exercise 30 minutes per day 5 days per week.           Relevant  Medications    amLODIPine (NORVASC) 5 MG tablet    Mixed hyperlipidemia (Chronic)    Current Assessment & Plan     FLP At Goal  Continue RX atorvastatin 20 mg q hs  F/U in 10 months with labs   Follow a low cholesterol, low saturated fat diet with less than 200 mg of cholesterol a day.   Avoid fried foods and high saturated fats (kelly, sausage, cookies, cakes, chips, cheese, whole milk, butter, mayonnaise, creamy dressings, gravy, stew, gumbo, boudin, cracklins and cream sauces).  Add flax seed or fish oil supplements to diet.   Increase dietary fiber.   Regular exercise improves cholesterol levels.  Physical activity 5 times a week for 30 minutes per day (or 150 minutes per week).   Stressed importance of dietary modifications.    Lab Results   Component Value Date    CHOL 161 05/23/2024     Lab Results   Component Value Date    HDL 32 (L) 05/23/2024     Lab Results   Component Value Date    TRIG 126 05/23/2024     Lab Results   Component Value Date    VLDL 25 05/23/2024     Lab Results   Component Value Date    .00 05/23/2024              Relevant Medications    atorvastatin (LIPITOR) 20 MG tablet       Renal/    Hematuria - Primary    Current Assessment & Plan     CT ABD/ Pelvis Ordered  F/U with results  ED Precautions          Relevant Orders    CT Abdomen Pelvis  Without Contrast       Other    Wellness examination    Relevant Orders    TSH    Hemoglobin A1C    Vitamin D    Comprehensive Metabolic Panel    Urinalysis, Reflex to Urine Culture    Lipid Panel    CBC Auto Differential     Other Visit Diagnoses       Prostate cancer screening        Relevant Orders    PSA, Screening            No future appointments.     Follow up in about 10 months (around 3/31/2025) for Wellness.      Signature:     [facility]  [address] [facility] [address]    Date of encounter: 5/31/24    The patient location is: home  The chief complaint leading to  consultation is: HLD F/U    Visit type: audiovisual    Face to Face time with patient: 15  20 minutes of total time spent on the encounter, which includes face to face time and non-face to face time preparing to see the patient (eg, review of tests), Obtaining and/or reviewing separately obtained history, Documenting clinical information in the electronic or other health record, Independently interpreting results (not separately reported) and communicating results to the patient/family/caregiver, or Care coordination (not separately reported).         Each patient to whom he or she provides medical services by telemedicine is:  (1) informed of the relationship between the physician and patient and the respective role of any other health care provider with respect to management of the patient; and (2) notified that he or she may decline to receive medical services by telemedicine and may withdraw from such care at any time.    Notes:

## 2024-06-13 ENCOUNTER — TELEPHONE (OUTPATIENT)
Dept: INTERNAL MEDICINE | Facility: CLINIC | Age: 49
End: 2024-06-13
Payer: COMMERCIAL

## 2024-06-13 NOTE — TELEPHONE ENCOUNTER
----- Message from Glenis Robins LPN sent at 6/13/2024  7:54 AM CDT -----  Regarding: FW: orders    ----- Message -----  From: Shandra Deutsch  Sent: 6/11/2024  12:17 PM CDT  To: Jameel MOON Staff  Subject: orders                                           Who Called: Celestino Alvarezvj    What order is the patient requesting: CT Scan of abdomen & Pelvic w/ no contrast  When does the expect the orders to be performed? ASAP @ [facility]   [phone]      Preferred Method of Contact: Phone Call  Patient's Preferred Phone Number on File: [phone]   Best Call Back Number, if different:  Additional Information: Mirta brooks/ [employer] called on behalf of pt requesting orders be sent to the place listed above can be reached at [phone] Ref # [id number]

## 2024-06-19 ENCOUNTER — PATIENT MESSAGE (OUTPATIENT)
Dept: INTERNAL MEDICINE | Facility: CLINIC | Age: 49
End: 2024-06-19
Payer: COMMERCIAL

## 2024-06-24 PROBLEM — Z00.00 WELLNESS EXAMINATION: Status: RESOLVED | Noted: 2024-03-25 | Resolved: 2024-06-24

## 2025-01-27 NOTE — ASSESSMENT & PLAN NOTE
FLP At Goal  Continue RX atorvastatin 20 mg q hs  F/U in 10 months with labs   Follow a low cholesterol, low saturated fat diet with less than 200 mg of cholesterol a day.   Avoid fried foods and high saturated fats (kelly, sausage, cookies, cakes, chips, cheese, whole milk, butter, mayonnaise, creamy dressings, gravy, stew, gumbo, boudin, cracklins and cream sauces).  Add flax seed or fish oil supplements to diet.   Increase dietary fiber.   Regular exercise improves cholesterol levels.  Physical activity 5 times a week for 30 minutes per day (or 150 minutes per week).   Stressed importance of dietary modifications.    Lab Results   Component Value Date    CHOL 161 05/23/2024     Lab Results   Component Value Date    HDL 32 (L) 05/23/2024     Lab Results   Component Value Date    TRIG 126 05/23/2024     Lab Results   Component Value Date    VLDL 25 05/23/2024     Lab Results   Component Value Date    .00 05/23/2024        65

## 2025-04-01 ENCOUNTER — OFFICE VISIT (OUTPATIENT)
Dept: INTERNAL MEDICINE | Facility: CLINIC | Age: 50
End: 2025-04-01
Payer: COMMERCIAL

## 2025-04-01 ENCOUNTER — LAB VISIT (OUTPATIENT)
Dept: LAB | Facility: HOSPITAL | Age: 50
End: 2025-04-01
Attending: NURSE PRACTITIONER
Payer: COMMERCIAL

## 2025-04-01 ENCOUNTER — RESULTS FOLLOW-UP (OUTPATIENT)
Dept: INTERNAL MEDICINE | Facility: CLINIC | Age: 50
End: 2025-04-01

## 2025-04-01 VITALS
TEMPERATURE: 98 F | HEIGHT: 70 IN | HEART RATE: 76 BPM | SYSTOLIC BLOOD PRESSURE: 164 MMHG | DIASTOLIC BLOOD PRESSURE: 96 MMHG | BODY MASS INDEX: 31.92 KG/M2 | RESPIRATION RATE: 18 BRPM | WEIGHT: 223 LBS

## 2025-04-01 DIAGNOSIS — Z12.5 PROSTATE CANCER SCREENING: ICD-10-CM

## 2025-04-01 DIAGNOSIS — Z00.00 WELLNESS EXAMINATION: ICD-10-CM

## 2025-04-01 DIAGNOSIS — I10 PRIMARY HYPERTENSION: Chronic | ICD-10-CM

## 2025-04-01 DIAGNOSIS — Z23 IMMUNIZATION DUE: ICD-10-CM

## 2025-04-01 DIAGNOSIS — Z00.00 WELLNESS EXAMINATION: Primary | ICD-10-CM

## 2025-04-01 LAB
25(OH)D3+25(OH)D2 SERPL-MCNC: 16 NG/ML (ref 30–80)
ALBUMIN SERPL-MCNC: 4.3 G/DL (ref 3.5–5)
ALBUMIN/GLOB SERPL: 1.2 RATIO (ref 1.1–2)
ALP SERPL-CCNC: 72 UNIT/L (ref 40–150)
ALT SERPL-CCNC: 51 UNIT/L (ref 0–55)
ANION GAP SERPL CALC-SCNC: 2 MEQ/L
AST SERPL-CCNC: 33 UNIT/L (ref 11–45)
BACTERIA #/AREA URNS AUTO: ABNORMAL /HPF
BASOPHILS # BLD AUTO: 0.03 X10(3)/MCL
BASOPHILS NFR BLD AUTO: 0.4 %
BILIRUB SERPL-MCNC: 0.6 MG/DL
BILIRUB UR QL STRIP.AUTO: NEGATIVE
BUN SERPL-MCNC: 12.3 MG/DL (ref 8.9–20.6)
CALCIUM SERPL-MCNC: 9.4 MG/DL (ref 8.4–10.2)
CHLORIDE SERPL-SCNC: 108 MMOL/L (ref 98–107)
CHOLEST SERPL-MCNC: 181 MG/DL
CHOLEST/HDLC SERPL: 5 {RATIO} (ref 0–5)
CLARITY UR: CLEAR
CO2 SERPL-SCNC: 28 MMOL/L (ref 22–29)
COLOR UR AUTO: COLORLESS
CREAT SERPL-MCNC: 1.05 MG/DL (ref 0.72–1.25)
CREAT/UREA NIT SERPL: 12
EOSINOPHIL # BLD AUTO: 0.19 X10(3)/MCL (ref 0–0.9)
EOSINOPHIL NFR BLD AUTO: 2.5 %
ERYTHROCYTE [DISTWIDTH] IN BLOOD BY AUTOMATED COUNT: 15.2 % (ref 11.5–17)
EST. AVERAGE GLUCOSE BLD GHB EST-MCNC: 116.9 MG/DL
GFR SERPLBLD CREATININE-BSD FMLA CKD-EPI: >60 ML/MIN/1.73/M2
GLOBULIN SER-MCNC: 3.6 GM/DL (ref 2.4–3.5)
GLUCOSE SERPL-MCNC: 88 MG/DL (ref 74–100)
GLUCOSE UR QL STRIP: NORMAL
GROUP & RH: NORMAL
HBA1C MFR BLD: 5.7 %
HCT VFR BLD AUTO: 42.1 % (ref 42–52)
HDLC SERPL-MCNC: 40 MG/DL (ref 35–60)
HGB BLD-MCNC: 14.2 G/DL (ref 14–18)
HGB UR QL STRIP: ABNORMAL
HYALINE CASTS #/AREA URNS LPF: ABNORMAL /LPF
IMM GRANULOCYTES # BLD AUTO: 0.02 X10(3)/MCL (ref 0–0.04)
IMM GRANULOCYTES NFR BLD AUTO: 0.3 %
KETONES UR QL STRIP: NEGATIVE
LDLC SERPL CALC-MCNC: 114 MG/DL (ref 50–140)
LEUKOCYTE ESTERASE UR QL STRIP: NEGATIVE
LYMPHOCYTES # BLD AUTO: 2.49 X10(3)/MCL (ref 0.6–4.6)
LYMPHOCYTES NFR BLD AUTO: 32.3 %
MCH RBC QN AUTO: 29.3 PG (ref 27–31)
MCHC RBC AUTO-ENTMCNC: 33.7 G/DL (ref 33–36)
MCV RBC AUTO: 86.8 FL (ref 80–94)
MONOCYTES # BLD AUTO: 0.48 X10(3)/MCL (ref 0.1–1.3)
MONOCYTES NFR BLD AUTO: 6.2 %
NEUTROPHILS # BLD AUTO: 4.49 X10(3)/MCL (ref 2.1–9.2)
NEUTROPHILS NFR BLD AUTO: 58.3 %
NITRITE UR QL STRIP: NEGATIVE
NRBC BLD AUTO-RTO: 0 %
PH UR STRIP: 7 [PH]
PLATELET # BLD AUTO: 281 X10(3)/MCL (ref 130–400)
PMV BLD AUTO: 9.8 FL (ref 7.4–10.4)
POTASSIUM SERPL-SCNC: 4.4 MMOL/L (ref 3.5–5.1)
PROT SERPL-MCNC: 7.9 GM/DL (ref 6.4–8.3)
PROT UR QL STRIP: NEGATIVE
PSA SERPL-MCNC: 0.31 NG/ML
RBC # BLD AUTO: 4.85 X10(6)/MCL (ref 4.7–6.1)
RBC #/AREA URNS AUTO: ABNORMAL /HPF
SODIUM SERPL-SCNC: 138 MMOL/L (ref 136–145)
SP GR UR STRIP.AUTO: 1.01 (ref 1–1.03)
SQUAMOUS #/AREA URNS LPF: ABNORMAL /HPF
TRIGL SERPL-MCNC: 134 MG/DL (ref 34–140)
TSH SERPL-ACNC: 1.37 UIU/ML (ref 0.35–4.94)
UROBILINOGEN UR STRIP-ACNC: NORMAL
VLDLC SERPL CALC-MCNC: 27 MG/DL
WBC # BLD AUTO: 7.7 X10(3)/MCL (ref 4.5–11.5)
WBC #/AREA URNS AUTO: ABNORMAL /HPF

## 2025-04-01 PROCEDURE — 3077F SYST BP >= 140 MM HG: CPT | Mod: CPTII,,, | Performed by: NURSE PRACTITIONER

## 2025-04-01 PROCEDURE — 81001 URINALYSIS AUTO W/SCOPE: CPT

## 2025-04-01 PROCEDURE — 86900 BLOOD TYPING SEROLOGIC ABO: CPT | Performed by: NURSE PRACTITIONER

## 2025-04-01 PROCEDURE — 84443 ASSAY THYROID STIM HORMONE: CPT

## 2025-04-01 PROCEDURE — 80053 COMPREHEN METABOLIC PANEL: CPT

## 2025-04-01 PROCEDURE — 82306 VITAMIN D 25 HYDROXY: CPT

## 2025-04-01 PROCEDURE — 36415 COLL VENOUS BLD VENIPUNCTURE: CPT

## 2025-04-01 PROCEDURE — 80061 LIPID PANEL: CPT

## 2025-04-01 PROCEDURE — 85025 COMPLETE CBC W/AUTO DIFF WBC: CPT

## 2025-04-01 PROCEDURE — 83036 HEMOGLOBIN GLYCOSYLATED A1C: CPT

## 2025-04-01 PROCEDURE — 90677 PCV20 VACCINE IM: CPT | Mod: PBBFAC

## 2025-04-01 PROCEDURE — 3008F BODY MASS INDEX DOCD: CPT | Mod: CPTII,,, | Performed by: NURSE PRACTITIONER

## 2025-04-01 PROCEDURE — 1160F RVW MEDS BY RX/DR IN RCRD: CPT | Mod: CPTII,,, | Performed by: NURSE PRACTITIONER

## 2025-04-01 PROCEDURE — 84153 ASSAY OF PSA TOTAL: CPT

## 2025-04-01 PROCEDURE — 1159F MED LIST DOCD IN RCRD: CPT | Mod: CPTII,,, | Performed by: NURSE PRACTITIONER

## 2025-04-01 PROCEDURE — 99396 PREV VISIT EST AGE 40-64: CPT | Mod: S$PBB,,, | Performed by: NURSE PRACTITIONER

## 2025-04-01 PROCEDURE — 90471 IMMUNIZATION ADMIN: CPT | Mod: PBBFAC

## 2025-04-01 PROCEDURE — 99214 OFFICE O/P EST MOD 30 MIN: CPT | Mod: PBBFAC | Performed by: NURSE PRACTITIONER

## 2025-04-01 PROCEDURE — 3080F DIAST BP >= 90 MM HG: CPT | Mod: CPTII,,, | Performed by: NURSE PRACTITIONER

## 2025-04-01 RX ORDER — AMLODIPINE BESYLATE 10 MG/1
10 TABLET ORAL NIGHTLY
Qty: 90 TABLET | Refills: 0 | Status: SHIPPED | OUTPATIENT
Start: 2025-04-01 | End: 2025-06-30

## 2025-04-01 RX ADMIN — PNEUMOCOCCAL 20-VALENT CONJUGATE VACCINE 0.5 ML
2.2; 2.2; 2.2; 2.2; 2.2; 2.2; 2.2; 2.2; 2.2; 2.2; 2.2; 2.2; 2.2; 2.2; 2.2; 2.2; 4.4; 2.2; 2.2; 2.2 INJECTION, SUSPENSION INTRAMUSCULAR at 10:04

## 2025-04-01 NOTE — PROGRESS NOTES
Teresa Jorgensen, JUAN LUIS   [facility]  [address] [facility] [address]      PATIENT NAME: Celestino Wade  : 1975  DATE: 25  MRN: [id number]      Reason for Visit / Chief Complaint: Annual Exam       History of Present Illness / Problem Focused Workflow     Celestino Wade presents to the clinic with Annual Exam     50 yo AAM here today for ff/u. Medical problems include HTN, HLD, and Tobacco use    Prostate Cancer Screening - 24 PSA 0.30  Colon Cancer Screening -   [facility] Gastro- Colonoscopy, Records Requested  Osteoporosis Screening - 24 Vitamin D level 12.6  Eye Screening - External Nitin   Dental Screening - External   Wellness Screening - 2025  <!--RTF_S-->  History of Present Illness  Patient presents today for a wellness visit He is a current smoker, reporting smoking on some days. He takes amlodipine 5 mg nightly. BP elevated today. Will increase to 10 mg daily. Will f/u next week via virtual for lab review and BP check with home monitor. Pt request Blood typing today. Denies any acute issues or concerns.             Review of Systems     Review of Systems   Constitutional: Negative.    HENT: Negative.     Eyes: Negative.    Respiratory: Negative.     Cardiovascular: Negative.    Gastrointestinal: Negative.    Endocrine: Negative.    Genitourinary: Negative.    Neurological: Negative.    Psychiatric/Behavioral: Negative.           Medications and Allergies     Medications  Medication List with Changes/Refills   Current Medications    ATORVASTATIN (LIPITOR) 20 MG TABLET    Take 1 tablet (20 mg total) by mouth every evening. For High Cholesterol   Changed and/or Refilled Medications    Modified Medication Previous Medication    AMLODIPINE (NORVASC) 10 MG TABLET amLODIPine (NORVASC) 5 MG tablet       Take 1 tablet (10 mg total) by mouth every evening. For High Blood Pressure    Take 1 tablet (5 mg total)  by mouth every evening. For High Blood Pressure         Allergies  Review of patient's allergies indicates:  No Known Allergies    Physical Examination     Vitals:    04/01/25 1030   BP: (!) 164/96   Pulse:    Resp:    Temp:      Physical Exam  Vitals reviewed.   Constitutional:       Appearance: Normal appearance. He is normal weight.   HENT:      Head: Normocephalic.   Cardiovascular:      Rate and Rhythm: Normal rate and regular rhythm.      Pulses: Normal pulses.      Heart sounds: Normal heart sounds.   Pulmonary:      Effort: Pulmonary effort is normal.      Breath sounds: Normal breath sounds.   Abdominal:      General: Abdomen is flat.      Palpations: Abdomen is soft.   Musculoskeletal:         General: Normal range of motion.      Cervical back: Normal range of motion.   Skin:     General: Skin is warm and dry.   Neurological:      Mental Status: He is alert.   Psychiatric:         Mood and Affect: Mood normal.           Results     Lab Results   Component Value Date    WBC 7.29 03/25/2024    RBC 4.94 03/25/2024    HGB 14.3 03/25/2024    HCT 43.4 03/25/2024    MCV 87.9 03/25/2024    MCH 28.9 03/25/2024    MCHC 32.9 (L) 03/25/2024    RDW 14.6 03/25/2024     03/25/2024    MPV 10.5 (H) 03/25/2024     Sodium   Date Value Ref Range Status   05/23/2024 140 136 - 145 mmol/L Final     Potassium   Date Value Ref Range Status   05/23/2024 4.1 3.5 - 5.1 mmol/L Final     Chloride   Date Value Ref Range Status   05/23/2024 106 98 - 107 mmol/L Final     CO2   Date Value Ref Range Status   05/23/2024 24 22 - 29 mmol/L Final     Blood Urea Nitrogen   Date Value Ref Range Status   05/23/2024 9.4 8.9 - 20.6 mg/dL Final     Creatinine   Date Value Ref Range Status   05/23/2024 1.10 0.73 - 1.18 mg/dL Final     Calcium   Date Value Ref Range Status   05/23/2024 9.7 8.4 - 10.2 mg/dL Final     Albumin   Date Value Ref Range Status   03/25/2024 4.2 3.5 - 5.0 g/dL Final     Bilirubin Total   Date Value Ref Range Status    03/25/2024 0.4 <=1.5 mg/dL Final     ALP   Date Value Ref Range Status   03/25/2024 66 40 - 150 unit/L Final     AST   Date Value Ref Range Status   03/25/2024 28 5 - 34 unit/L Final     ALT   Date Value Ref Range Status   03/25/2024 37 0 - 55 unit/L Final     Estimated GFR-Non    Date Value Ref Range Status   09/23/2021 85 (L) >>=90 mL/min/1.73 m2 Final     Lab Results   Component Value Date    CHOL 161 05/23/2024     Lab Results   Component Value Date    HDL 32 (L) 05/23/2024     Lab Results   Component Value Date    TRIG 126 05/23/2024     Lab Results   Component Value Date    VLDL 25 05/23/2024     Lab Results   Component Value Date    .00 05/23/2024     Lab Results   Component Value Date    TSH 2.149 03/25/2024     Lab Results   Component Value Date    PHUR 5.5 05/23/2024    PROTEINUA Negative 05/23/2024    GLUCUA Normal 05/23/2024    OCCULTUA 1+ (A) 05/23/2024    NITRITE Negative 05/23/2024    LEUKOCYTESUR Negative 05/23/2024     Lab Results   Component Value Date    HGBA1C 5.5 03/25/2024           Assessment         ICD-10-CM ICD-9-CM   1. Wellness examination  Z00.00 V70.0   2. Immunization due  Z23 V05.9   3. Primary hypertension  I10 401.9       Plan      1. Wellness examination  Assessment & Plan:  Pt wellness visit completed today with appropriate lab work.    Topics Reviewed / Updated  Immunizations Discussed  Dicussed Healthy Diet &   Encouraged to exercise 3 x weekly  Increase Water Intake  Eat more fruits and vegetables  Avoid soda & alcohol      Orders:  -     ABO/Rh; Future; Expected date: 04/01/2025    2. Immunization due  -     pneumoc 20-ac conj-dip cr(PF) (PREVNAR-20 (PF)) injection Syrg 0.5 mL    3. Primary hypertension  -     amLODIPine (NORVASC) 10 MG tablet; Take 1 tablet (10 mg total) by mouth every evening. For High Blood Pressure  Dispense: 90 tablet; Refill: 0         Future Appointments   Date Time Provider Department Center   4/1/2025 11:00 AM [facility]   4/11/2025 10:20 AM [facility]            Signature:     [facility]  [address] [facility] [address]    Date of encounter: 4/1/25    This note was generated with the assistance of ambient listening technology. Verbal consent was obtained by the patient and accompanying visitor(s) for the recording of patient appointment to facilitate this note. I attest to having reviewed and edited the generated note for accuracy, though some syntax or spelling errors may persist. Please contact the author of this note for any clarification.

## 2025-04-11 ENCOUNTER — PATIENT MESSAGE (OUTPATIENT)
Dept: INTERNAL MEDICINE | Facility: CLINIC | Age: 50
End: 2025-04-11

## 2025-04-11 ENCOUNTER — OFFICE VISIT (OUTPATIENT)
Dept: INTERNAL MEDICINE | Facility: CLINIC | Age: 50
End: 2025-04-11
Payer: COMMERCIAL

## 2025-04-11 DIAGNOSIS — Z12.5 PROSTATE CANCER SCREENING: ICD-10-CM

## 2025-04-11 DIAGNOSIS — E55.9 VITAMIN D DEFICIENCY: Primary | ICD-10-CM

## 2025-04-11 DIAGNOSIS — R73.03 PREDIABETES: ICD-10-CM

## 2025-04-11 DIAGNOSIS — E78.2 MIXED HYPERLIPIDEMIA: Chronic | ICD-10-CM

## 2025-04-11 DIAGNOSIS — Z00.00 WELLNESS EXAMINATION: ICD-10-CM

## 2025-04-11 DIAGNOSIS — I10 PRIMARY HYPERTENSION: Chronic | ICD-10-CM

## 2025-04-11 RX ORDER — ERGOCALCIFEROL 1.25 MG/1
50000 CAPSULE ORAL
Qty: 8 CAPSULE | Refills: 0 | Status: SHIPPED | OUTPATIENT
Start: 2025-04-11 | End: 2025-05-31

## 2025-04-11 RX ORDER — ATORVASTATIN CALCIUM 20 MG/1
20 TABLET, FILM COATED ORAL NIGHTLY
Qty: 90 TABLET | Refills: 4 | Status: SHIPPED | OUTPATIENT
Start: 2025-04-11 | End: 2026-07-05

## 2025-04-11 RX ORDER — AMLODIPINE BESYLATE 10 MG/1
10 TABLET ORAL NIGHTLY
Qty: 90 TABLET | Refills: 4 | Status: SHIPPED | OUTPATIENT
Start: 2025-04-11 | End: 2026-07-05

## 2025-04-11 NOTE — ASSESSMENT & PLAN NOTE
Follow ADA diet.  Avoid soda, simple sweets, and limit rice/pasta/bread/starches and consume brown options when possible.   Maintain healthy weight with BMI goal <30.   Perform aerobic exercise for 150 minutes per week (or 5 days a week for 30 minutes each day).   Examine feet daily.     Lab Results   Component Value Date    HGBA1C 5.7 04/01/2025

## 2025-04-11 NOTE — PROGRESS NOTES
Teresa Jorgensen, JUAN LUIS   [facility]  [address] [facility] [address]      PATIENT NAME: Celestino Wade  : 1975  DATE: 25  MRN: [id number]      Reason for Visit / Chief Complaint: Hyperlipidemia       History of Present Illness / Problem Focused Workflow     Celestino Wade presents to the clinic with Hyperlipidemia     48 yo AAM here today for ff/u. Medical problems include HTN, HLD, and Tobacco use    Prostate Cancer Screening - 24 PSA 0.31  Colon Cancer Screening -   [facility]astro- Colonoscopy, Records Requested  Osteoporosis Screening - 24 Vitamin D level 12.6  Eye Screening - External Nitin   Dental Screening - External   Wellness Screening - 2025  History of Present Illness  Patient presents today for a wellness visit He is a current smoker, reporting smoking on some days. He takes amlodipine 5 mg nightly. BP elevated today. Will increase to 10 mg daily. Will f/u next week via virtual for lab review and BP check with home monitor. Pt request Blood typing today. Denies any acute issues or concerns.     2025  History of Present Illness  Patient presents today for follow up. Blood counts, electrolytes, kidney function, liver function tests, thyroid levels, and prostate levels were normal. Blood type is AB positive. Vitamin D level was very low. He is currently taking vitamin D3 supplements. UA noted trace hematuria, CT order pending. Meds reviewed and refilled appropriately. Will f/u in 1 year for wellness and prn.               Review of Systems     Review of Systems   Constitutional:  Negative for activity change and unexpected weight change.   HENT:  Negative for hearing loss, rhinorrhea and trouble swallowing.    Eyes:  Negative for discharge and visual disturbance.   Respiratory:  Negative for chest tightness and wheezing.    Cardiovascular:  Negative for chest pain and palpitations.    Gastrointestinal:  Negative for blood in stool, constipation, diarrhea and vomiting.   Endocrine: Negative for polydipsia and polyuria.   Genitourinary:  Negative for difficulty urinating, hematuria and urgency.   Musculoskeletal:  Negative for arthralgias, joint swelling and neck pain.   Neurological:  Negative for weakness.   Psychiatric/Behavioral:  Negative for confusion and dysphoric mood.          Medications and Allergies     Medications  Medication List with Changes/Refills   New Medications    ERGOCALCIFEROL (ERGOCALCIFEROL) 50,000 UNIT CAP    Take 1 capsule (50,000 Units total) by mouth every 7 days. For Low Vitamin D. Start over the counter once completed. for 8 doses   Changed and/or Refilled Medications    Modified Medication Previous Medication    AMLODIPINE (NORVASC) 10 MG TABLET amLODIPine (NORVASC) 10 MG tablet       Take 1 tablet (10 mg total) by mouth every evening. For High Blood Pressure    Take 1 tablet (10 mg total) by mouth every evening. For High Blood Pressure    ATORVASTATIN (LIPITOR) 20 MG TABLET atorvastatin (LIPITOR) 20 MG tablet       Take 1 tablet (20 mg total) by mouth every evening. For High Cholesterol    Take 1 tablet (20 mg total) by mouth every evening. For High Cholesterol         Allergies  Review of patient's allergies indicates:  No Known Allergies    Physical Examination   There were no vitals filed for this visit.  Physical Exam  HENT:      Right Ear: Hearing normal.      Left Ear: Hearing normal.   Neurological:      Mental Status: He is alert and oriented to person, place, and time.   Psychiatric:         Mood and Affect: Mood normal.           Results     Lab Results   Component Value Date    WBC 7.70 04/01/2025    RBC 4.85 04/01/2025    HGB 14.2 04/01/2025    HCT 42.1 04/01/2025    MCV 86.8 04/01/2025    MCH 29.3 04/01/2025    MCHC 33.7 04/01/2025    RDW 15.2 04/01/2025     04/01/2025    MPV 9.8 04/01/2025     Sodium   Date Value Ref Range Status   04/01/2025  138 136 - 145 mmol/L Final     Potassium   Date Value Ref Range Status   04/01/2025 4.4 3.5 - 5.1 mmol/L Final     Chloride   Date Value Ref Range Status   04/01/2025 108 (H) 98 - 107 mmol/L Final     CO2   Date Value Ref Range Status   04/01/2025 28 22 - 29 mmol/L Final     Blood Urea Nitrogen   Date Value Ref Range Status   04/01/2025 12.3 8.9 - 20.6 mg/dL Final     Creatinine   Date Value Ref Range Status   04/01/2025 1.05 0.72 - 1.25 mg/dL Final     Calcium   Date Value Ref Range Status   04/01/2025 9.4 8.4 - 10.2 mg/dL Final     Albumin   Date Value Ref Range Status   04/01/2025 4.3 3.5 - 5.0 g/dL Final     Bilirubin Total   Date Value Ref Range Status   04/01/2025 0.6 <=1.5 mg/dL Final     ALP   Date Value Ref Range Status   04/01/2025 72 40 - 150 unit/L Final     AST   Date Value Ref Range Status   04/01/2025 33 11 - 45 unit/L Final     ALT   Date Value Ref Range Status   04/01/2025 51 0 - 55 unit/L Final     Estimated GFR-Non    Date Value Ref Range Status   09/23/2021 85 (L) >>=90 mL/min/1.73 m2 Final     Lab Results   Component Value Date    CHOL 181 04/01/2025     Lab Results   Component Value Date    HDL 40 04/01/2025     Lab Results   Component Value Date    TRIG 134 04/01/2025     Lab Results   Component Value Date    VLDL 27 04/01/2025     Lab Results   Component Value Date    .00 04/01/2025     Lab Results   Component Value Date    TSH 1.366 04/01/2025     Lab Results   Component Value Date    PHUR 7.0 04/01/2025    PROTEINUA Negative 04/01/2025    GLUCUA Normal 04/01/2025    OCCULTUA Trace (A) 04/01/2025    NITRITE Negative 04/01/2025    LEUKOCYTESUR Negative 04/01/2025     Lab Results   Component Value Date    HGBA1C 5.7 04/01/2025    HGBA1C 5.5 03/25/2024           Assessment         ICD-10-CM ICD-9-CM   1. Vitamin D deficiency  E55.9 268.9   2. Wellness examination  Z00.00 V70.0   3. Prediabetes  R73.03 790.29   4. Prostate cancer screening  Z12.5 V76.44   5. Primary  hypertension  I10 401.9   6. Mixed hyperlipidemia  E78.2 272.2       Plan      1. Vitamin D deficiency  Assessment & Plan:  Educated on increasing foods high in Vitamin D such as fish oil, cod liver oil, salmon, milk fortified with vitamin D.  RX Vitamin D3 80826 IU weekly x 12 weeks.  Complete entire 12 weeks of Vitamin D prescription.  After completion of prescription (12 weeks/3 months), begin taking Vitamin D 2000 I.U. tablets daily (purchase over the counter).  Repeat Vitamin D level as ordered.    Lab Results   Component Value Date    [id number] 16 (L) 04/01/2025         Orders:  -     ergocalciferol (ERGOCALCIFEROL) 50,000 unit Cap; Take 1 capsule (50,000 Units total) by mouth every 7 days. For Low Vitamin D. Start over the counter once completed. for 8 doses  Dispense: 8 capsule; Refill: 0  -     Vitamin D; Future; Expected date: 04/11/2026    2. Wellness examination  -     TSH; Future; Expected date: 04/11/2026  -     Hemoglobin A1C; Future; Expected date: 04/11/2026  -     Vitamin D; Future; Expected date: 04/11/2026  -     Comprehensive Metabolic Panel; Future; Expected date: 04/11/2026  -     Urinalysis, Reflex to Urine Culture; Future; Expected date: 04/11/2026  -     Lipid Panel; Future; Expected date: 04/11/2026  -     CBC Auto Differential; Future; Expected date: 04/11/2026    3. Prediabetes  Assessment & Plan:    Follow ADA diet.  Avoid soda, simple sweets, and limit rice/pasta/bread/starches and consume brown options when possible.   Maintain healthy weight with BMI goal <30.   Perform aerobic exercise for 150 minutes per week (or 5 days a week for 30 minutes each day).   Examine feet daily.     Lab Results   Component Value Date    HGBA1C 5.7 04/01/2025         Orders:  -     Hemoglobin A1C; Future; Expected date: 04/11/2026    4. Prostate cancer screening  -     PSA, Screening; Future; Expected date: 04/11/2026    5. Primary hypertension  -     amLODIPine (NORVASC) 10 MG tablet; Take 1 tablet  (10 mg total) by mouth every evening. For High Blood Pressure  Dispense: 90 tablet; Refill: 4    6. Mixed hyperlipidemia  -     atorvastatin (LIPITOR) 20 MG tablet; Take 1 tablet (20 mg total) by mouth every evening. For High Cholesterol  Dispense: 90 tablet; Refill: 4         No future appointments.           Signature:     [facility]  [address] [facility] [address]    Date of encounter: 4/11/25    This note was generated with the assistance of ambient listening technology. Verbal consent was obtained by the patient and accompanying visitor(s) for the recording of patient appointment to facilitate this note. I attest to having reviewed and edited the generated note for accuracy, though some syntax or spelling errors may persist. Please contact the author of this note for any clarification.    The patient location is: home  The chief complaint leading to consultation is: lab review    Visit type: audiovisual    Face to Face time with patient: 15  20 minutes of total time spent on the encounter, which includes face to face time and non-face to face time preparing to see the patient (eg, review of tests), Obtaining and/or reviewing separately obtained history, Documenting clinical information in the electronic or other health record, Independently interpreting results (not separately reported) and communicating results to the patient/family/caregiver, or Care coordination (not separately reported).         Each patient to whom he or she provides medical services by telemedicine is:  (1) informed of the relationship between the physician and patient and the respective role of any other health care provider with respect to management of the patient; and (2) notified that he or she may decline to receive medical services by telemedicine and may withdraw from such care at any time.    Notes:

## 2025-04-11 NOTE — ASSESSMENT & PLAN NOTE
Educated on increasing foods high in Vitamin D such as fish oil, cod liver oil, salmon, milk fortified with vitamin D.  RX Vitamin D3 66363 IU weekly x 12 weeks.  Complete entire 12 weeks of Vitamin D prescription.  After completion of prescription (12 weeks/3 months), begin taking Vitamin D 2000 I.U. tablets daily (purchase over the counter).  Repeat Vitamin D level as ordered.    Lab Results   Component Value Date    [id number] 16 (L) 04/01/2025

## 2025-04-21 ENCOUNTER — PATIENT MESSAGE (OUTPATIENT)
Dept: ADMINISTRATIVE | Facility: HOSPITAL | Age: 50
End: 2025-04-21
Payer: COMMERCIAL

## 2025-05-13 DIAGNOSIS — Z12.11 COLON CANCER SCREENING: Primary | ICD-10-CM

## 2025-05-19 ENCOUNTER — TELEPHONE (OUTPATIENT)
Dept: INTERNAL MEDICINE | Facility: CLINIC | Age: 50
End: 2025-05-19
Payer: COMMERCIAL

## 2025-05-19 NOTE — TELEPHONE ENCOUNTER
----- Message from JUAN LUIS Cline sent at 5/16/2025 10:36 AM CDT -----    ----- Message -----  From: Teresa Jorgensen FNP  Sent: 5/13/2025  12:00 AM CDT  To: JUAN LUIS Donnelly    Mail FIT test